# Patient Record
Sex: MALE | Race: OTHER | URBAN - METROPOLITAN AREA
[De-identification: names, ages, dates, MRNs, and addresses within clinical notes are randomized per-mention and may not be internally consistent; named-entity substitution may affect disease eponyms.]

---

## 2022-02-07 ENCOUNTER — NEW PATIENT (OUTPATIENT)
Dept: URBAN - METROPOLITAN AREA CLINIC 7 | Facility: CLINIC | Age: 42
End: 2022-02-07

## 2022-02-07 DIAGNOSIS — H52.13: ICD-10-CM

## 2022-02-07 PROCEDURE — 92015 DETERMINE REFRACTIVE STATE: CPT

## 2022-02-07 PROCEDURE — 92004 COMPRE OPH EXAM NEW PT 1/>: CPT

## 2022-02-07 ASSESSMENT — VISUAL ACUITY
OU_SC: 20/20-2
OD_SC: 20/25-1
OS_SC: 20/300

## 2022-02-07 ASSESSMENT — TONOMETRY
OS_IOP_MMHG: 12
OD_IOP_MMHG: 12

## 2022-02-07 ASSESSMENT — KERATOMETRY
OS_K1POWER_DIOPTERS: 44.25
OD_AXISANGLE_DEGREES: 33
OD_AXISANGLE2_DEGREES: 123
OS_AXISANGLE2_DEGREES: 50
OS_AXISANGLE_DEGREES: 140
OD_K1POWER_DIOPTERS: 44.50
OD_K2POWER_DIOPTERS: 44.75
OS_K2POWER_DIOPTERS: 44.75

## 2022-02-07 NOTE — PATIENT DISCUSSION
Stable.
The patient was here for a vision care examination. There were no untoward findings noted. Repeat evaluation in one to two years is indicated.
Updated glasses Rx today.
POST-OP DIAGNOSIS:  Skin cyst 19-Apr-2021 10:57:50  Lauren Coleman Wan